# Patient Record
(demographics unavailable — no encounter records)

---

## 2025-02-18 NOTE — HISTORY OF PRESENT ILLNESS
[FreeTextEntry1] : establish care [de-identified] : 75 years old female with high cholesterol, anxiety, presents to establish care, for annual physical exam; refers stomach pain, epigastric , seen by GI already

## 2025-02-18 NOTE — PLAN
[FreeTextEntry1] : 1. encounter to establish care/annual physical exam * routine general labs done * age appropriate health maintenance recommendations reviewed, discussed including healthy diet, regular exercise 2. hypercholesterolemia, * low cholesterol, low triglycerides diet, dietary counseling given; dietary avoidance discussed; diet and exercise reviewed with patient * c/w atorvastatin 20 mg, refilled 3. anxiety * c/w sertraline 25 mg 4. memory impairment * c/w donepezil 10 mg * f/u in three months

## 2025-02-18 NOTE — HEALTH RISK ASSESSMENT
[Good] : ~his/her~  mood as  good [No] : No [No falls in past year] : Patient reported no falls in the past year [0] : 2) Feeling down, depressed, or hopeless: Not at all (0) [PHQ-2 Negative - No further assessment needed] : PHQ-2 Negative - No further assessment needed [Patient reported colonoscopy was abnormal] : Patient reported colonoscopy was abnormal [HIV test declined] : HIV test declined [Hepatitis C test declined] : Hepatitis C test declined [None] : None [With Family] : lives with family [Retired] : retired [College] : College [] :  [# Of Children ___] : has [unfilled] children [Feels Safe at Home] : Feels safe at home [Fully functional (bathing, dressing, toileting, transferring, walking, feeding)] : Fully functional (bathing, dressing, toileting, transferring, walking, feeding) [Fully functional (using the telephone, shopping, preparing meals, housekeeping, doing laundry, using] : Fully functional and needs no help or supervision to perform IADLs (using the telephone, shopping, preparing meals, housekeeping, doing laundry, using transportation, managing medications and managing finances) [Reports changes in hearing] : Reports changes in hearing [Smoke Detector] : smoke detector [Seat Belt] :  uses seat belt [Never] : Never [NO] : No [[id number]] : 0 [Change in mental status noted] : No change in mental status noted [Sexually Active] : not sexually active [Reports changes in vision] : Reports no changes in vision [Reports changes in dental health] : Reports no changes in dental health [MammogramDate] : 02/2025 [ColonoscopyDate] : 2023 [ColonoscopyComments] : polyps [de-identified] : wear reading glasses